# Patient Record
Sex: FEMALE | Race: BLACK OR AFRICAN AMERICAN | NOT HISPANIC OR LATINO | Employment: UNEMPLOYED | URBAN - METROPOLITAN AREA
[De-identification: names, ages, dates, MRNs, and addresses within clinical notes are randomized per-mention and may not be internally consistent; named-entity substitution may affect disease eponyms.]

---

## 2023-11-08 ENCOUNTER — TELEPHONE (OUTPATIENT)
Dept: PODIATRY | Facility: CLINIC | Age: 43
End: 2023-11-08
Payer: MEDICAID

## 2023-11-08 NOTE — TELEPHONE ENCOUNTER
"Reschedule patient due to being 17 minutes late for her appointment. Rescheduled to 11/23/23 at 1:00 pm. No further issues discussed. ----- Message from China Reid LPN sent at 11/8/2023 10:19 AM CST -----  Regarding: FW: Appointment    ----- Message -----  From: Shruthi Issa  Sent: 11/8/2023  10:06 AM CST  To: [facility]  Subject: Appointment                                      "Type:  Patient Call Back    Who Called:Pt    What is the reqeust in detail:Pt is scheduled today running late. Please advise    Can the clinic reply by [facility]?no    Best Call Back Number:[phone]      Additional Information:Pt was stuck by train on the way will arrive by 10:15              "

## 2023-11-20 ENCOUNTER — OFFICE VISIT (OUTPATIENT)
Dept: PODIATRY | Facility: CLINIC | Age: 43
End: 2023-11-20
Payer: MEDICAID

## 2023-11-20 VITALS
WEIGHT: 138 LBS | SYSTOLIC BLOOD PRESSURE: 99 MMHG | HEART RATE: 71 BPM | BODY MASS INDEX: 24.45 KG/M2 | HEIGHT: 63 IN | DIASTOLIC BLOOD PRESSURE: 62 MMHG

## 2023-11-20 DIAGNOSIS — M79.674 PAIN IN TOES OF BOTH FEET: ICD-10-CM

## 2023-11-20 DIAGNOSIS — M79.674 PAIN DUE TO ONYCHOMYCOSIS OF TOENAILS OF BOTH FEET: ICD-10-CM

## 2023-11-20 DIAGNOSIS — M79.675 PAIN IN TOES OF BOTH FEET: ICD-10-CM

## 2023-11-20 DIAGNOSIS — B35.1 PAIN DUE TO ONYCHOMYCOSIS OF TOENAILS OF BOTH FEET: ICD-10-CM

## 2023-11-20 DIAGNOSIS — M79.675 PAIN DUE TO ONYCHOMYCOSIS OF TOENAILS OF BOTH FEET: ICD-10-CM

## 2023-11-20 DIAGNOSIS — B35.3 TINEA PEDIS, UNSPECIFIED LATERALITY: Primary | ICD-10-CM

## 2023-11-20 DIAGNOSIS — L74.513 HYPERHIDROSIS OF SOLES: ICD-10-CM

## 2023-11-20 PROCEDURE — 1159F PR MEDICATION LIST DOCUMENTED IN MEDICAL RECORD: ICD-10-PCS | Mod: CPTII,,, | Performed by: PODIATRIST

## 2023-11-20 PROCEDURE — 3008F BODY MASS INDEX DOCD: CPT | Mod: CPTII,,, | Performed by: PODIATRIST

## 2023-11-20 PROCEDURE — 99999 PR PBB SHADOW E&M-EST. PATIENT-LVL III: CPT | Mod: PBBFAC,,, | Performed by: PODIATRIST

## 2023-11-20 PROCEDURE — 99203 PR OFFICE/OUTPT VISIT, NEW, LEVL III, 30-44 MIN: ICD-10-PCS | Mod: S$PBB,,, | Performed by: PODIATRIST

## 2023-11-20 PROCEDURE — 3008F PR BODY MASS INDEX (BMI) DOCUMENTED: ICD-10-PCS | Mod: CPTII,,, | Performed by: PODIATRIST

## 2023-11-20 PROCEDURE — 3074F PR MOST RECENT SYSTOLIC BLOOD PRESSURE < 130 MM HG: ICD-10-PCS | Mod: CPTII,,, | Performed by: PODIATRIST

## 2023-11-20 PROCEDURE — 3078F DIAST BP <80 MM HG: CPT | Mod: CPTII,,, | Performed by: PODIATRIST

## 2023-11-20 PROCEDURE — 99999 PR PBB SHADOW E&M-EST. PATIENT-LVL III: ICD-10-PCS | Mod: PBBFAC,,, | Performed by: PODIATRIST

## 2023-11-20 PROCEDURE — 3074F SYST BP LT 130 MM HG: CPT | Mod: CPTII,,, | Performed by: PODIATRIST

## 2023-11-20 PROCEDURE — 99213 OFFICE O/P EST LOW 20 MIN: CPT | Mod: PBBFAC,PN | Performed by: PODIATRIST

## 2023-11-20 PROCEDURE — 99203 OFFICE O/P NEW LOW 30 MIN: CPT | Mod: S$PBB,,, | Performed by: PODIATRIST

## 2023-11-20 PROCEDURE — 1159F MED LIST DOCD IN RCRD: CPT | Mod: CPTII,,, | Performed by: PODIATRIST

## 2023-11-20 PROCEDURE — 3078F PR MOST RECENT DIASTOLIC BLOOD PRESSURE < 80 MM HG: ICD-10-PCS | Mod: CPTII,,, | Performed by: PODIATRIST

## 2023-11-20 RX ORDER — GENTIAN VIOLET 1% 10 MG/ML
0.5 LIQUID TOPICAL DAILY
Qty: 59 ML | Refills: 0 | Status: SHIPPED | OUTPATIENT
Start: 2023-11-20

## 2023-11-20 NOTE — PATIENT INSTRUCTIONS
Pick one & use for at least 3-6 months (for your fungal nails)/ 2-4 weeks (for your skin):    1. Listerine mouthwash (yellow/gold) - soak for 5-10minutes daily (may re-use solution up to a week)    2. Vicks Vaporub to nails daily after a bath/end of day/bedtime.    3. Lamisil (terbanafine) 1% antifungal cream daily to nails after shower.         Gentian violet between toes daily after bathing until healed, then weekly.

## 2023-11-20 NOTE — PROGRESS NOTES
Subjective:      Patient ID: Bridgette Faust is a 43 y.o. female.    Chief Complaint: No chief complaint on file.    Bridgette is a 43 y.o. female who presents new to the clinic c/o pain d/t thick & discolored toenails on both feet x 6 months. Using Kerasal but only softens. Bridgette is inquiring about tx options.   Has pain also between toes 4,5 B/L L>R so sometimes can't walk. Pain level 8/10 w/ certain shoes. Admits to hyperhidrosis.    Goes to gym. Babysits daily. Wears slippers b/c tennis shoes hurt.    PCP: Armand Watts on [facility]    No past medical history on file.  There is no problem list on file for this patient.     Objective:      ROS  Physical Exam  Vitals reviewed.   Constitutional:       General: She is not in acute distress.     Appearance: She is well-developed and normal weight.   Cardiovascular:      Pulses: Normal pulses.           Dorsalis pedis pulses are 2+ on the right side and 2+ on the left side.   Musculoskeletal:         General: Tenderness present. No swelling or signs of injury.      Right lower leg: No edema.      Left lower leg: No edema.   Feet:      Comments: Toenails 1st R>L & 5th B/L >> 2nd, 3rd, 4th B/L are  thickened, dystrophic, discolored, w/ crumbly subungual debris.  Tender to distal nail plate pressure B/L hallux, w/out periungual skin abnormality noted.     Skin:     General: Skin is warm and moist.      Capillary Refill: Capillary refill takes less than 2 seconds.      Findings: Rash present. No bruising, erythema or lesion. Rash is not scaling.      Comments: Macerated intertriginous spaces 4th B/L > 3rd R w/out erythema.   Neurological:      Mental Status: She is alert and oriented to person, place, and time.      Sensory: Sensation is intact. No sensory deficit.      Motor: Motor function is intact. No weakness or abnormal muscle tone.      Gait: Gait is intact. Gait normal.   Psychiatric:         Mood and Affect: Affect normal. Mood  is anxious.         Behavior: Behavior normal. Behavior is cooperative.         Assessment:      Encounter Diagnoses   Name Primary?    Tinea pedis, unspecified laterality Yes    Pain in toes of both feet     Pain due to onychomycosis of toenails of both feet     Hyperhidrosis of soles        Plan:       Diagnoses and all orders for this visit:    Tinea pedis, unspecified laterality  -     gentian violet 1 % topical solution; Apply 0.5 mLs topically once daily.    Pain in toes of both feet    Pain due to onychomycosis of toenails of both feet    Hyperhidrosis of soles    I counseled the patient on her conditions, their implications & medical mgmt.    - Shoe inspection. Patient instructed on proper foot hygeine.    - With patient's permission, nails were aggressively reduced & debrided to their soft tissue attachment mechanically, removing all offending nail & debris. Patient relates relief following the procedure.     Instructions provided on OTC topical antifungal tx options for nails & web spaces.    Advised on use of Gentian violet to web spaces qd after bathing until resolution of sx, then q weekly to prevent recurrence.        A total of 32 mins.was spent on chart review, patient visit & documentation.

## 2023-11-28 NOTE — PROCEDURES
Nail debridement    Date/Time: 11/20/2023 1:00 PM    Performed by: Cher Stark DPM  Authorized by: Cher Stark DPM    Consent Done?:  Yes (Verbal)    Nail Care Type:  Debride  Location(s): All  (Left 1st Toe, Left 3rd Toe, Left 2nd Toe, Left 4th Toe, Left 5th Toe, Right 1st Toe, Right 2nd Toe, Right 3rd Toe, Right 4th Toe and Right 5th Toe)  Patient tolerance:  Patient tolerated the procedure well with no immediate complications

## 2024-04-09 ENCOUNTER — OFFICE VISIT (OUTPATIENT)
Dept: PODIATRY | Facility: CLINIC | Age: 44
End: 2024-04-09
Payer: MEDICAID

## 2024-04-09 VITALS
DIASTOLIC BLOOD PRESSURE: 63 MMHG | HEART RATE: 68 BPM | SYSTOLIC BLOOD PRESSURE: 94 MMHG | BODY MASS INDEX: 24.35 KG/M2 | HEIGHT: 63 IN | WEIGHT: 137.44 LBS

## 2024-04-09 DIAGNOSIS — B35.3 TINEA PEDIS, UNSPECIFIED LATERALITY: ICD-10-CM

## 2024-04-09 DIAGNOSIS — L84 HELOMA MOLLE: Primary | ICD-10-CM

## 2024-04-09 DIAGNOSIS — M79.674 PAIN IN TOES OF BOTH FEET: ICD-10-CM

## 2024-04-09 DIAGNOSIS — M79.675 PAIN IN TOES OF BOTH FEET: ICD-10-CM

## 2024-04-09 PROCEDURE — 99213 OFFICE O/P EST LOW 20 MIN: CPT | Mod: S$PBB,,, | Performed by: PODIATRIST

## 2024-04-09 PROCEDURE — 3078F DIAST BP <80 MM HG: CPT | Mod: CPTII,,, | Performed by: PODIATRIST

## 2024-04-09 PROCEDURE — 99999 PR PBB SHADOW E&M-EST. PATIENT-LVL II: CPT | Mod: PBBFAC,,, | Performed by: PODIATRIST

## 2024-04-09 PROCEDURE — 3008F BODY MASS INDEX DOCD: CPT | Mod: CPTII,,, | Performed by: PODIATRIST

## 2024-04-09 PROCEDURE — 3074F SYST BP LT 130 MM HG: CPT | Mod: CPTII,,, | Performed by: PODIATRIST

## 2024-04-09 PROCEDURE — 99212 OFFICE O/P EST SF 10 MIN: CPT | Mod: PBBFAC,PN | Performed by: PODIATRIST

## 2024-04-09 NOTE — PROGRESS NOTES
Subjective:      Patient ID: Bridgette Faust is a 43 y.o. female.    Chief Complaint: Fungus    Patient presents complaining of pain to her toes and foot fungus.  Last here about 4+ months ago.  Pain currently 10/10 in shoes.  11/23/23  Bridgette is a 43 y.o. female who presents new to the clinic c/o pain d/t thick & discolored toenails on both feet x 6 months. Using Kerasal but only softens. Bridgette is inquiring about tx options.   Has pain also between toes 4,5 B/L L>R so sometimes can't walk. Pain level 8/10 w/ certain shoes. Admits to hyperhidrosis.    Goes to gym. Babysits daily. Wears slippers b/c tennis shoes hurt.    PCP: Armand Watts on [facility]    No past medical history on file.  There is no problem list on file for this patient.     Objective:      Review of Systems   Constitutional: Negative for malaise/fatigue.   Skin:  Positive for dry skin, rash and suspicious lesions. Negative for color change and itching.   Musculoskeletal:  Positive for myalgias. Negative for falls and joint pain.   Psychiatric/Behavioral:  The patient is not nervous/anxious.      Physical Exam  Vitals reviewed.   Constitutional:       General: She is not in acute distress.     Appearance: She is well-developed and normal weight.   Cardiovascular:      Pulses: Normal pulses.           Dorsalis pedis pulses are 2+ on the right side and 2+ on the left side.   Musculoskeletal:         General: Tenderness present. No swelling or signs of injury.      Right lower leg: No edema.      Left lower leg: No edema.   Feet:      Comments: Toenails 1st R>L & 5th B/L >> 2nd, 3rd, 4th B/L are  thickened, dystrophic, discolored, w/ crumbly subungual debris.  Tender to distal nail plate pressure B/L hallux, w/out periungual skin abnormality noted.     Skin:     General: Skin is warm and moist.      Capillary Refill: Capillary refill takes less than 2 seconds.      Findings: Lesion and rash present. No bruising or  erythema. Rash is not scaling.      Comments: Macerated intertriginous spaces 4th B/L > 3rd R w/out erythema.  Hyperkeratosis medial aspect base of 5th toe R>L w/out underlying ulceration, consistent w/ hm.   Neurological:      Mental Status: She is alert and oriented to person, place, and time.      Sensory: Sensation is intact. No sensory deficit.      Motor: Motor function is intact. No weakness or abnormal muscle tone.      Gait: Gait is intact. Gait normal.   Psychiatric:         Mood and Affect: Affect normal. Mood is anxious.         Behavior: Behavior normal. Behavior is cooperative.         Assessment:      Encounter Diagnoses   Name Primary?    Heloma molle Yes    Pain in toes of both feet      Problem List Items Addressed This Visit    None  Visit Diagnoses       Heloma molle    -  Primary    Pain in toes of both feet                 Plan:       Bridgette was seen today for fungus.    Diagnoses and all orders for this visit:    Heloma molle    Pain in toes of both feet    I counseled the patient on her conditions, their implications & medical mgmt.    - Shoe inspection. Patient instructed on proper foot hygeine.    - W/ patient's permission, courtesy debridement of corns 4th web space & medial 5th proximal phalanx B/L, to their soft tissue attachment mechanically, removing all offending debris. Patient relates relief following the procedure.   Dispensed digital tube sleeve toe B/L for use regularly whenever in closed toe shoes to prevent recurrence of cc.    Advised on continued use of Gentian violet to web spaces qd after bathing q weekly to prevent recurrence of SX.    Discussed surgical intervention including condylectomy medial 5th toe & derotational arthroplasty OR syndactylization 4th & 5th toe, under local w/ MAC sedation on an OP basis, if above conservative measures do not provide adequate relief.        A total of 28 mins.was spent on chart review, patient visit & documentation.

## 2024-10-17 ENCOUNTER — OFFICE VISIT (OUTPATIENT)
Dept: PODIATRY | Facility: CLINIC | Age: 44
End: 2024-10-17
Payer: MEDICAID

## 2024-10-17 VITALS
BODY MASS INDEX: 23.99 KG/M2 | HEART RATE: 71 BPM | WEIGHT: 135.38 LBS | SYSTOLIC BLOOD PRESSURE: 92 MMHG | DIASTOLIC BLOOD PRESSURE: 62 MMHG | HEIGHT: 63 IN

## 2024-10-17 DIAGNOSIS — M79.674 PAIN IN TOES OF BOTH FEET: ICD-10-CM

## 2024-10-17 DIAGNOSIS — M79.675 PAIN IN TOES OF BOTH FEET: ICD-10-CM

## 2024-10-17 DIAGNOSIS — L84 HELOMA MOLLE: ICD-10-CM

## 2024-10-17 DIAGNOSIS — B35.3 TINEA PEDIS OF BOTH FEET: Primary | ICD-10-CM

## 2024-10-17 PROCEDURE — 99213 OFFICE O/P EST LOW 20 MIN: CPT | Mod: PBBFAC,PN | Performed by: PODIATRIST

## 2024-10-17 PROCEDURE — 99213 OFFICE O/P EST LOW 20 MIN: CPT | Mod: S$PBB,,, | Performed by: PODIATRIST

## 2024-10-17 PROCEDURE — 1159F MED LIST DOCD IN RCRD: CPT | Mod: CPTII,,, | Performed by: PODIATRIST

## 2024-10-17 PROCEDURE — 3074F SYST BP LT 130 MM HG: CPT | Mod: CPTII,,, | Performed by: PODIATRIST

## 2024-10-17 PROCEDURE — 3008F BODY MASS INDEX DOCD: CPT | Mod: CPTII,,, | Performed by: PODIATRIST

## 2024-10-17 PROCEDURE — 99999 PR PBB SHADOW E&M-EST. PATIENT-LVL III: CPT | Mod: PBBFAC,,, | Performed by: PODIATRIST

## 2024-10-17 PROCEDURE — 3078F DIAST BP <80 MM HG: CPT | Mod: CPTII,,, | Performed by: PODIATRIST
